# Patient Record
Sex: FEMALE | Race: OTHER | URBAN - METROPOLITAN AREA
[De-identification: names, ages, dates, MRNs, and addresses within clinical notes are randomized per-mention and may not be internally consistent; named-entity substitution may affect disease eponyms.]

---

## 2023-02-28 ENCOUNTER — HOSPITAL ENCOUNTER (OUTPATIENT)
Dept: LAB | Age: 83
Discharge: HOME | End: 2023-02-28

## 2023-02-28 ENCOUNTER — OFFICE VISIT (OUTPATIENT)
Dept: FAMILY MEDICINE CLINIC | Age: 83
End: 2023-02-28

## 2023-02-28 VITALS
WEIGHT: 139.2 LBS | OXYGEN SATURATION: 99 % | DIASTOLIC BLOOD PRESSURE: 72 MMHG | HEART RATE: 78 BPM | SYSTOLIC BLOOD PRESSURE: 160 MMHG | BODY MASS INDEX: 28.06 KG/M2 | HEIGHT: 59 IN | TEMPERATURE: 97.7 F

## 2023-02-28 DIAGNOSIS — I10 PRIMARY HYPERTENSION: ICD-10-CM

## 2023-02-28 DIAGNOSIS — K52.9 GASTROENTERITIS: ICD-10-CM

## 2023-02-28 DIAGNOSIS — E11.9 CONTROLLED TYPE 2 DIABETES MELLITUS WITHOUT COMPLICATION, WITHOUT LONG-TERM CURRENT USE OF INSULIN (HCC): ICD-10-CM

## 2023-02-28 DIAGNOSIS — Z13.9 ENCOUNTER FOR SCREENING: ICD-10-CM

## 2023-02-28 DIAGNOSIS — K52.9 GASTROENTERITIS: Primary | ICD-10-CM

## 2023-02-28 LAB
ALBUMIN SERPL-MCNC: 3.2 G/DL (ref 3.5–5)
ALBUMIN/GLOB SERPL: 0.8 (ref 1.1–2.2)
ALP SERPL-CCNC: 103 U/L (ref 45–117)
ALT SERPL-CCNC: 17 U/L (ref 12–78)
ANION GAP SERPL CALC-SCNC: 8 MMOL/L (ref 5–15)
AST SERPL-CCNC: 10 U/L (ref 15–37)
BASOPHILS # BLD: 0 K/UL (ref 0–0.1)
BASOPHILS NFR BLD: 0 % (ref 0–1)
BILIRUB SERPL-MCNC: 0.2 MG/DL (ref 0.2–1)
BUN SERPL-MCNC: 14 MG/DL (ref 6–20)
BUN/CREAT SERPL: 14 (ref 12–20)
CALCIUM SERPL-MCNC: 9.3 MG/DL (ref 8.5–10.1)
CHLORIDE SERPL-SCNC: 105 MMOL/L (ref 97–108)
CHOLEST SERPL-MCNC: 165 MG/DL
CO2 SERPL-SCNC: 26 MMOL/L (ref 21–32)
CREAT SERPL-MCNC: 1.02 MG/DL (ref 0.55–1.02)
CREAT UR-MCNC: 82.9 MG/DL
DIFFERENTIAL METHOD BLD: ABNORMAL
EOSINOPHIL # BLD: 0.1 K/UL (ref 0–0.4)
EOSINOPHIL NFR BLD: 1 % (ref 0–7)
ERYTHROCYTE [DISTWIDTH] IN BLOOD BY AUTOMATED COUNT: 12.4 % (ref 11.5–14.5)
EST. AVERAGE GLUCOSE BLD GHB EST-MCNC: 143 MG/DL
GLOBULIN SER CALC-MCNC: 4.1 G/DL (ref 2–4)
GLUCOSE POC: 62 MG/DL
GLUCOSE SERPL-MCNC: 122 MG/DL (ref 65–100)
HBA1C MFR BLD: 6.6 % (ref 4–5.6)
HCT VFR BLD AUTO: 37.4 % (ref 35–47)
HDLC SERPL-MCNC: 37 MG/DL
HDLC SERPL: 4.5 (ref 0–5)
HGB BLD-MCNC: 11.6 G/DL (ref 11.5–16)
IMM GRANULOCYTES # BLD AUTO: 0 K/UL (ref 0–0.04)
IMM GRANULOCYTES NFR BLD AUTO: 0 % (ref 0–0.5)
LDLC SERPL CALC-MCNC: 102.8 MG/DL (ref 0–100)
LYMPHOCYTES # BLD: 3 K/UL (ref 0.8–3.5)
LYMPHOCYTES NFR BLD: 26 % (ref 12–49)
MCH RBC QN AUTO: 30.6 PG (ref 26–34)
MCHC RBC AUTO-ENTMCNC: 31 G/DL (ref 30–36.5)
MCV RBC AUTO: 98.7 FL (ref 80–99)
MICROALBUMIN UR-MCNC: 1.17 MG/DL
MICROALBUMIN/CREAT UR-RTO: 14 MG/G (ref 0–30)
MONOCYTES # BLD: 0.6 K/UL (ref 0–1)
MONOCYTES NFR BLD: 5 % (ref 5–13)
NEUTS SEG # BLD: 7.7 K/UL (ref 1.8–8)
NEUTS SEG NFR BLD: 68 % (ref 32–75)
NRBC # BLD: 0 K/UL (ref 0–0.01)
NRBC BLD-RTO: 0 PER 100 WBC
PLATELET # BLD AUTO: 350 K/UL (ref 150–400)
PMV BLD AUTO: 10.9 FL (ref 8.9–12.9)
POTASSIUM SERPL-SCNC: 4.5 MMOL/L (ref 3.5–5.1)
PROT SERPL-MCNC: 7.3 G/DL (ref 6.4–8.2)
RBC # BLD AUTO: 3.79 M/UL (ref 3.8–5.2)
SODIUM SERPL-SCNC: 139 MMOL/L (ref 136–145)
TRIGL SERPL-MCNC: 126 MG/DL (ref ?–150)
TSH SERPL DL<=0.05 MIU/L-ACNC: 1.08 UIU/ML (ref 0.36–3.74)
VLDLC SERPL CALC-MCNC: 25.2 MG/DL
WBC # BLD AUTO: 11.5 K/UL (ref 3.6–11)

## 2023-02-28 PROCEDURE — 85025 COMPLETE CBC W/AUTO DIFF WBC: CPT

## 2023-02-28 PROCEDURE — 84443 ASSAY THYROID STIM HORMONE: CPT

## 2023-02-28 PROCEDURE — 80053 COMPREHEN METABOLIC PANEL: CPT

## 2023-02-28 PROCEDURE — 82043 UR ALBUMIN QUANTITATIVE: CPT

## 2023-02-28 PROCEDURE — 1123F ACP DISCUSS/DSCN MKR DOCD: CPT | Performed by: NURSE PRACTITIONER

## 2023-02-28 PROCEDURE — 36415 COLL VENOUS BLD VENIPUNCTURE: CPT

## 2023-02-28 PROCEDURE — 99214 OFFICE O/P EST MOD 30 MIN: CPT | Performed by: NURSE PRACTITIONER

## 2023-02-28 PROCEDURE — 80061 LIPID PANEL: CPT

## 2023-02-28 PROCEDURE — 3077F SYST BP >= 140 MM HG: CPT | Performed by: NURSE PRACTITIONER

## 2023-02-28 PROCEDURE — 82962 GLUCOSE BLOOD TEST: CPT | Performed by: NURSE PRACTITIONER

## 2023-02-28 PROCEDURE — 83036 HEMOGLOBIN GLYCOSYLATED A1C: CPT

## 2023-02-28 PROCEDURE — 3078F DIAST BP <80 MM HG: CPT | Performed by: NURSE PRACTITIONER

## 2023-02-28 RX ORDER — BISOPROLOL FUMARATE AND HYDROCHLOROTHIAZIDE 10; 6.25 MG/1; MG/1
1 TABLET ORAL DAILY
Qty: 30 TABLET | Refills: 0 | Status: SHIPPED | OUTPATIENT
Start: 2023-02-28

## 2023-02-28 RX ORDER — METFORMIN HYDROCHLORIDE 850 MG/1
850 TABLET ORAL 2 TIMES DAILY WITH MEALS
Qty: 30 TABLET | Refills: 0 | Status: SHIPPED | OUTPATIENT
Start: 2023-02-28

## 2023-02-28 RX ORDER — GLIPIZIDE 5 MG/1
TABLET ORAL
Qty: 30 TABLET | Refills: 0 | Status: SHIPPED | OUTPATIENT
Start: 2023-02-28

## 2023-02-28 NOTE — PROGRESS NOTES
Coordination of Care  1. Have you been to the ER, urgent care clinic since your last visit? Hospitalized since your last visit? No    2. Have you seen or consulted any other health care providers outside of the [address] [place] [address] since your last visit? Include any pap smears or colon screening. No    Does the patient need refills? N/A    Learning Assessment Complete? no  Depression Screening complete in the past 12 months? yes    Patient  glucose was measured 62. Hypoglycemia protocol put in place. Provider notified and patient given 4 glucose tablets. Blood sugar was rechecked in 15 minutes. Patient stated she has not eaten today. Patient niece stated that patient just arrived from [place] two weeks ago and has a history of diabetes and HTN.

## 2023-02-28 NOTE — PATIENT INSTRUCTIONS
Glibenclamide 5 mg es mas o menos el mismo de Glipizide 5 mg    Metformina 850 mg = metformin 850 mg    Concor 10 mg is a beta blocker. It often comes as a combination medicine, Concur 10 mg/hctz 6.25 mg. Aspirina baby = baby aspirin 81 mg    Diagnoses and all orders for this visit:    1. Gastroenteritis    2. Encounter for screening  -     AMB POC GLUCOSE BLOOD, BY GLUCOSE MONITORING DEVICE  -     LIPID PANEL; Future  -     METABOLIC PANEL, COMPREHENSIVE; Future  -     CBC WITH AUTOMATED DIFF; Future  -     HEMOGLOBIN A1C WITH EAG; Future  -     MICROALBUMIN, UR, RAND W/ MICROALB/CREAT RATIO; Future  -     TSH 3RD GENERATION; Future    3. Controlled type 2 diabetes mellitus without complication, without long-term current use of insulin ([address]    Other orders  -     bisoprolol-hydroCHLOROthiazide (ZIAC) 10-6.25 mg per tablet; Take 1 Tablet by mouth daily. For blood pressure.  -     metFORMIN (GLUCOPHAGE) 850 mg tablet; Take 1 Tablet by mouth two (2) times daily (with meals).   -     glipiZIDE (GLUCOTROL) 5 mg tablet; 1 po 30 minutes before food daily for diabetes

## 2023-02-28 NOTE — PROGRESS NOTES
2023 : Phillip Bylas Sandy (: 1940) is a [de-identified] y.o. female, new patient, here for evaluation of the following chief complaint(s):  Establish Care     ASSESSMENT/PLAN:  Below is the assessment and plan developed based on review of pertinent history, physical exam, labs, studies, and medications. 1. Gastroenteritis  -     CBC WITH AUTOMATED DIFF; Future  2. Encounter for screening  3. Controlled type 2 diabetes mellitus without complication, without long-term current use of insulin (HCC)  -     AMB POC GLUCOSE BLOOD, BY GLUCOSE MONITORING DEVICE  -     LIPID PANEL; Future  -     METABOLIC PANEL, COMPREHENSIVE; Future  -     HEMOGLOBIN A1C WITH EAG; Future  -     MICROALBUMIN, UR, RAND W/ MICROALB/CREAT RATIO; Future  -     TSH 3RD GENERATION; Future  -     metFORMIN (GLUCOPHAGE) 850 mg tablet; Take 1 Tablet by mouth two (2) times daily (with meals). , Normal, Disp-30 Tablet, R-0  -     glipiZIDE (GLUCOTROL) 5 mg tablet; 1 po 30 minutes before food daily for diabetes, Normal, Disp-30 Tablet, R-0  4. Primary hypertension  -     bisoprolol-hydroCHLOROthiazide (ZIAC) 10-6.25 mg per tablet; Take 1 Tablet by mouth daily. For blood pressure., Normal, Disp-30 Tablet, R-0  -sick day management  Return for 2 wks LK. SUBJECTIVE/OBJECTIVE:  HPI Metformin 850 mg qday and Glibenclamida 5 mg - can cut glibenclamide in half  Diarrhea yesterday and slept all day. Baby aspirin  Concor 10 mg (bisoprolol)  Glucosamine chondroitin  Penicillin cream - arm scratch/bump  [place] for 2 weeks  From [address] [place] [address]. No one is taking care of her there. She eats very little. Doesn't want to drink water. She was skinnier before now. Last time  took Concur was yesterday.   Also has taken haley harris - not recently  Results for orders placed or performed in visit on 23   AMB POC GLUCOSE BLOOD, BY GLUCOSE MONITORING DEVICE   Result Value Ref Range    Glucose POC 62 MG/DL   After 4 glucose tablets, her blood glucose was in the 70s. I gave her an additional 4 glucose tablets. No results found for: HBA1C, MCACR, MCA1, MCA2, MCA3, MCAU, LDL, LDLC, DLDLP, KIM, CREAPOC, ACREA, CREA, REFC3, REFC4, UPA6UTDK, PDI0IHCO  Review of Systems: No weight loss, polydipsia, polyuria, polyphagia, hypoglycemic symptoms, numbness/tingling/pain of extremities, myalgias, vision changes, chest pain, dyspnea, shortness of breath, TIAs, swelling. Sucradel (sucralfato 1 mg) and suedo  Social History:  reports that she has never smoked. She has never used smokeless tobacco. She reports that she does not drink alcohol and does not use drugs. Current Medications:   Current Outpatient Medications   Medication Sig    bisoprolol-hydroCHLOROthiazide (ZIAC) 10-6.25 mg per tablet Take 1 Tablet by mouth daily. For blood pressure. metFORMIN (GLUCOPHAGE) 850 mg tablet Take 1 Tablet by mouth two (2) times daily (with meals). glipiZIDE (GLUCOTROL) 5 mg tablet 1 po 30 minutes before food daily for diabetes     Physical Examination: No LMP recorded. (Menstrual status: Menopause). Blood pressure (!) 160/72, pulse 78, temperature 97.7 °F (36.5 °C), temperature source Temporal, height 4' 10.66\" (1.49 m), weight 139 lb 3.2 oz (63.1 kg), SpO2 99 %. General appearance - well developed, no acute distress. Chest - clear to auscultation. Heart - regular rate and rhythm without murmurs, rubs, or gallops. Abdomen - bowel sounds present x 4, NT, ND  Extremities - no CCE. An electronic signature was used to authenticate this note.   -- David Nolan NP

## 2023-03-01 NOTE — PROGRESS NOTES
Name and  confirmed w/ patient and her family memberes. An After Visit Summary was provided and all discharge instructions were reviewed with the patient including: new medications, side-effects, f/up appt, Goodrx coupon use, and chronic illness education and materials. RN went over medication instructions in depth and clarified with provider that pt should not take glipizide if she is not going to eat. Emphasized need for family to  a glucometer today and gave a printout showing the relion glucometer compact at Pending sale to [facility]. Provided preliminary glucometer teaching and explained that they can look up videos online for more in-depth teaching once they have the kit. Explained symptoms of hyper/hypoglycemia and how to respond to each, including when to seek emergency care. Family expressed understanding. Message sent to  requesting diabetic teaching appt. Time for questions and answers provided, patient/family members verbalized understanding. Patient discharged from clinic in stable condition. [facility]  #[id number] assisted with interpretation.

## 2023-03-08 ENCOUNTER — CLINICAL SUPPORT (OUTPATIENT)
Dept: FAMILY MEDICINE CLINIC | Age: 83
End: 2023-03-08

## 2023-03-08 DIAGNOSIS — Z71.89 ENCOUNTER FOR DIABETES EDUCATION: Primary | ICD-10-CM

## 2023-03-08 NOTE — PROGRESS NOTES
Assisted in discharge. Name and date of birth of the patient verified. Diabetic teaching was done that including the following:     - Definition of Type 1 and Type 2.  - Symptoms of Hypoglycemia and Hyperglycemia.  - How to use a glucometer.  - How dispose of lancets safely  - Target glucose numbers for fasting and after meals.   - Medication administration per provider order.  - Diabetic foot care. Patient was accompanied by her daughter who was a co-learner. All of the diabetes medications were reviewed with the patient and her daughter. Patient verbalized understanding by repeat back method. Diabetic education also included teaching on a healthy diabetic diet and recommended exercises.

## 2023-08-31 ENCOUNTER — HOSPITAL ENCOUNTER (EMERGENCY)
Facility: HOSPITAL | Age: 83
Discharge: HOME | End: 2023-08-31
Attending: STUDENT IN AN ORGANIZED HEALTH CARE EDUCATION/TRAINING PROGRAM

## 2023-08-31 ENCOUNTER — APPOINTMENT (OUTPATIENT)
Facility: HOSPITAL | Age: 83
End: 2023-08-31

## 2023-08-31 VITALS
HEART RATE: 75 BPM | BODY MASS INDEX: 26.56 KG/M2 | SYSTOLIC BLOOD PRESSURE: 144 MMHG | RESPIRATION RATE: 27 BRPM | TEMPERATURE: 99.9 F | DIASTOLIC BLOOD PRESSURE: 73 MMHG | OXYGEN SATURATION: 98 % | WEIGHT: 130 LBS

## 2023-08-31 DIAGNOSIS — E16.2 HYPOGLYCEMIA: ICD-10-CM

## 2023-08-31 DIAGNOSIS — B34.9 VIRAL ILLNESS: Primary | ICD-10-CM

## 2023-08-31 DIAGNOSIS — R50.9 FEVER, UNSPECIFIED FEVER CAUSE: ICD-10-CM

## 2023-08-31 LAB
ALBUMIN SERPL-MCNC: 3.2 G/DL (ref 3.5–5)
ALBUMIN/GLOB SERPL: 0.6 (ref 1.1–2.2)
ALP SERPL-CCNC: 109 U/L (ref 45–117)
ALT SERPL-CCNC: 31 U/L (ref 12–78)
ANION GAP SERPL CALC-SCNC: 6 MMOL/L (ref 5–15)
AST SERPL-CCNC: 43 U/L (ref 15–37)
BASOPHILS # BLD: 0 K/UL (ref 0–0.1)
BASOPHILS NFR BLD: 0 % (ref 0–1)
BILIRUB SERPL-MCNC: 0.4 MG/DL (ref 0.2–1)
BUN SERPL-MCNC: 20 MG/DL (ref 6–20)
BUN/CREAT SERPL: 18 (ref 12–20)
CALCIUM SERPL-MCNC: 9.2 MG/DL (ref 8.5–10.1)
CHLORIDE SERPL-SCNC: 102 MMOL/L (ref 97–108)
CO2 SERPL-SCNC: 28 MMOL/L (ref 21–32)
COMMENT:: NORMAL
CREAT SERPL-MCNC: 1.14 MG/DL (ref 0.55–1.02)
DIFFERENTIAL METHOD BLD: ABNORMAL
EOSINOPHIL # BLD: 0 K/UL (ref 0–0.4)
EOSINOPHIL NFR BLD: 0 % (ref 0–7)
ERYTHROCYTE [DISTWIDTH] IN BLOOD BY AUTOMATED COUNT: 13 % (ref 11.5–14.5)
GLOBULIN SER CALC-MCNC: 5.2 G/DL (ref 2–4)
GLUCOSE BLD STRIP.AUTO-MCNC: 141 MG/DL (ref 65–117)
GLUCOSE SERPL-MCNC: 91 MG/DL (ref 65–100)
HCT VFR BLD AUTO: 39.5 % (ref 35–47)
HGB BLD-MCNC: 12.7 G/DL (ref 11.5–16)
IMM GRANULOCYTES # BLD AUTO: 0 K/UL (ref 0–0.04)
IMM GRANULOCYTES NFR BLD AUTO: 0 % (ref 0–0.5)
LACTATE SERPL-SCNC: 1.3 MMOL/L (ref 0.4–2)
LYMPHOCYTES # BLD: 0.9 K/UL (ref 0.8–3.5)
LYMPHOCYTES NFR BLD: 9 % (ref 12–49)
MCH RBC QN AUTO: 30.8 PG (ref 26–34)
MCHC RBC AUTO-ENTMCNC: 32.2 G/DL (ref 30–36.5)
MCV RBC AUTO: 95.6 FL (ref 80–99)
MONOCYTES # BLD: 0.5 K/UL (ref 0–1)
MONOCYTES NFR BLD: 5 % (ref 5–13)
NEUTS SEG # BLD: 9.2 K/UL (ref 1.8–8)
NEUTS SEG NFR BLD: 86 % (ref 32–75)
NRBC # BLD: 0 K/UL (ref 0–0.01)
NRBC BLD-RTO: 0 PER 100 WBC
PLATELET # BLD AUTO: 351 K/UL (ref 150–400)
PMV BLD AUTO: 10.4 FL (ref 8.9–12.9)
POTASSIUM SERPL-SCNC: 3.9 MMOL/L (ref 3.5–5.1)
PROT SERPL-MCNC: 8.4 G/DL (ref 6.4–8.2)
RBC # BLD AUTO: 4.13 M/UL (ref 3.8–5.2)
SARS-COV-2 RDRP RESP QL NAA+PROBE: NOT DETECTED
SERVICE CMNT-IMP: ABNORMAL
SODIUM SERPL-SCNC: 136 MMOL/L (ref 136–145)
SOURCE: NORMAL
SPECIMEN HOLD: NORMAL
TROPONIN I SERPL HS-MCNC: 58 NG/L (ref 0–51)
WBC # BLD AUTO: 10.6 K/UL (ref 3.6–11)

## 2023-08-31 PROCEDURE — 96360 HYDRATION IV INFUSION INIT: CPT

## 2023-08-31 PROCEDURE — 85025 COMPLETE CBC W/AUTO DIFF WBC: CPT

## 2023-08-31 PROCEDURE — 87635 SARS-COV-2 COVID-19 AMP PRB: CPT

## 2023-08-31 PROCEDURE — 84484 ASSAY OF TROPONIN QUANT: CPT

## 2023-08-31 PROCEDURE — 87040 BLOOD CULTURE FOR BACTERIA: CPT

## 2023-08-31 PROCEDURE — 2580000003 HC RX 258: Performed by: EMERGENCY MEDICINE

## 2023-08-31 PROCEDURE — 83605 ASSAY OF LACTIC ACID: CPT

## 2023-08-31 PROCEDURE — 82962 GLUCOSE BLOOD TEST: CPT

## 2023-08-31 PROCEDURE — 71046 X-RAY EXAM CHEST 2 VIEWS: CPT

## 2023-08-31 PROCEDURE — 80053 COMPREHEN METABOLIC PANEL: CPT

## 2023-08-31 PROCEDURE — 93005 ELECTROCARDIOGRAM TRACING: CPT | Performed by: STUDENT IN AN ORGANIZED HEALTH CARE EDUCATION/TRAINING PROGRAM

## 2023-08-31 PROCEDURE — 99285 EMERGENCY DEPT VISIT HI MDM: CPT

## 2023-08-31 PROCEDURE — 6370000000 HC RX 637 (ALT 250 FOR IP): Performed by: EMERGENCY MEDICINE

## 2023-08-31 PROCEDURE — 36415 COLL VENOUS BLD VENIPUNCTURE: CPT

## 2023-08-31 RX ORDER — 0.9 % SODIUM CHLORIDE 0.9 %
1000 INTRAVENOUS SOLUTION INTRAVENOUS ONCE
Status: COMPLETED | OUTPATIENT
Start: 2023-08-31 | End: 2023-08-31

## 2023-08-31 RX ORDER — ACETAMINOPHEN 500 MG
1000 TABLET ORAL
Status: COMPLETED | OUTPATIENT
Start: 2023-08-31 | End: 2023-08-31

## 2023-08-31 RX ADMIN — SODIUM CHLORIDE 1000 ML: 9 INJECTION, SOLUTION INTRAVENOUS at 16:04

## 2023-08-31 RX ADMIN — ACETAMINOPHEN 1000 MG: 500 TABLET ORAL at 16:04

## 2023-08-31 ASSESSMENT — PAIN - FUNCTIONAL ASSESSMENT: PAIN_FUNCTIONAL_ASSESSMENT: 0-10

## 2023-08-31 NOTE — ED TRIAGE NOTES
Patient arrived via EMS from family members home. Per EMS, patient c/o generalized weakness and hypotensive en route. Patient c/o sore throat that started yesterday and n/v. Patient denies abd pain, chest pain.  on arrival, BG 60 POA per EMS.       #[id number] used in triage

## 2023-08-31 NOTE — ED PROVIDER NOTES
Patient signed out to me by Dr. oBy Billings at 3 PM.  Patient currently resting comfortably at this time with normal vital signs. She has no complaints. Sounds like she has been complaining of fatigue, sore throat, chills for a couple of days. Just arrived from [facility]. Was initially found to be hypotensive and hypoglycemic. These resolved with IV fluids and dextrose. Patient's been resting comfortably with no complaints at this time. Her work-up in the ER was largely unremarkable with a negative chest x-ray, COVID swab. Her labs were reassuring with a normal lactate, white count, electrolytes. No evidence of any dangerous infection at this time. She likely does have a viral illness contributing to her symptoms and probably got dehydrated. Patient would very much like to go home with her daughter. I recommended increasing her oral intake and monitoring her blood sugars closely. I spoke with the daughter who is also at the patient's bedside. They agreed to return if she has any worsening symptoms again.      Rosalia Redmond MD  08/31/23 2423
  [address]  [phone]    If symptoms worsen      DISCHARGE MEDICATIONS:  New Prescriptions    No medications on file         (Please note that portions of this note were completed with a voice recognition program.  Efforts were made to edit the dictations but occasionally words are mis-transcribed.)    Dwayne Castillo MD (electronically signed)  Emergency Attending Physician               Dwayne Castillo MD  08/31/23 [id number]

## 2023-08-31 NOTE — ED NOTES
Pt was discharged at this time. pt verbalized understanding of all discharge instructions. Pt remains a&ox3. Resps are even and unlabored. Skin warm and dry. No distress noted. Pt ambulated out of ed with a steady gait.        Carin Piña RN  08/31/23 9706

## 2023-09-01 LAB
EKG ATRIAL RATE: 73 BPM
EKG DIAGNOSIS: NORMAL
EKG P AXIS: 55 DEGREES
EKG P-R INTERVAL: 136 MS
EKG Q-T INTERVAL: 412 MS
EKG QRS DURATION: 74 MS
EKG QTC CALCULATION (BAZETT): 453 MS
EKG R AXIS: 27 DEGREES
EKG T AXIS: 59 DEGREES
EKG VENTRICULAR RATE: 73 BPM

## 2023-09-03 LAB
BACTERIA SPEC CULT: NORMAL
BACTERIA SPEC CULT: NORMAL
SERVICE CMNT-IMP: NORMAL
SERVICE CMNT-IMP: NORMAL

## 2023-09-05 ENCOUNTER — TELEPHONE (OUTPATIENT)
Age: 83
End: 2023-09-05

## 2025-01-10 NOTE — TELEPHONE ENCOUNTER
T/C made to the patient with assistance from [facility]  #[id number], session code #62016 to follow-up after her 08-31-23 [facility] ED visit.    There was no answer at the listed home/cell phone so a message was left asking for a return call to nurse Fernández at the [facility], [phone], about her recent ED visit. Jolene Flores RN